# Patient Record
Sex: FEMALE | Race: BLACK OR AFRICAN AMERICAN | Employment: UNEMPLOYED | URBAN - METROPOLITAN AREA
[De-identification: names, ages, dates, MRNs, and addresses within clinical notes are randomized per-mention and may not be internally consistent; named-entity substitution may affect disease eponyms.]

---

## 2017-03-23 ENCOUNTER — OFFICE VISIT (OUTPATIENT)
Dept: INTERNAL MEDICINE CLINIC | Age: 32
End: 2017-03-23

## 2017-03-23 VITALS
HEART RATE: 67 BPM | WEIGHT: 160 LBS | TEMPERATURE: 97.2 F | OXYGEN SATURATION: 100 % | HEIGHT: 62 IN | BODY MASS INDEX: 29.44 KG/M2 | RESPIRATION RATE: 16 BRPM | DIASTOLIC BLOOD PRESSURE: 57 MMHG | SYSTOLIC BLOOD PRESSURE: 105 MMHG

## 2017-03-23 DIAGNOSIS — F41.8 SITUATIONAL ANXIETY: Primary | ICD-10-CM

## 2017-03-23 RX ORDER — ALPRAZOLAM 0.5 MG/1
0.5 TABLET ORAL
Qty: 15 TAB | Refills: 0 | Status: SHIPPED | OUTPATIENT
Start: 2017-03-23 | End: 2018-02-23 | Stop reason: ALTCHOICE

## 2017-03-23 NOTE — MR AVS SNAPSHOT
Visit Information Date & Time Provider Department Dept. Phone Encounter #  
 3/23/2017 10:15 AM Darin Box MD [facility] Internal Medicine [phone] [id number] Follow-up Instructions Return in about 2 weeks (around 4/6/2017) for complete physical  and fasting blood work. Dwayne Mary Upcoming Health Maintenance Date Due Pneumococcal 19-64 Medium Risk (1 of 1 - PPSV23) 4/24/2002 DTaP/Tdap/Td series (1 - Tdap) 4/24/2004 PAP AKA CERVICAL CYTOLOGY 4/24/2004 Allergies as of 3/23/2017  Review Complete On: 3/23/2017 By: Bernadette Rowe MD  
  
 Severity Noted Reaction Type Reactions Ibuprofen  08/16/2012    Hives Motrin [Ibuprofen]  12/05/2012    Hives, Itching Current Immunizations  Never Reviewed No immunizations on file. Not reviewed this visit You Were Diagnosed With   
  
 Codes Comments Situational anxiety    -  Primary ICD-10-CM: F41.8 ICD-9-CM: 300.09 Vitals BP Pulse Temp Resp Height(growth percentile) Weight(growth percentile) 105/57 (BP 1 Location: Left arm, BP Patient Position: Sitting) 67 97.2 °F (36.2 °C) (Oral) 16 5' 2\" (1.575 m) 160 lb (72.6 kg) LMP SpO2 BMI OB Status Smoking Status 03/03/2017 100% 29.26 kg/m2 Having regular periods Current Every Day Smoker Vitals History BMI and BSA Data Body Mass Index Body Surface Area  
 29.26 kg/m 2 1.78 m 2 Preferred Pharmacy Pharmacy Name Phone [id number], [address] [place] [address] [phone] Your Updated Medication List  
  
   
This list is accurate as of: 3/23/17 10:46 AM.  Always use your most recent med list.  
  
  
  
  
 ALPRAZolam 0.5 mg tablet Commonly known as:  Rosalba Adams Take 1 Tab by mouth nightly as needed. Max Daily Amount: 0.5 mg. Indications: ANXIETY HYDROcodone-acetaminophen 7.5-325 mg per tablet Commonly known as:  Tico Guevara  
 Take 1 Tab by mouth every six (6) hours as needed for Pain. Max Daily Amount: 4 Tabs. oxyCODONE-acetaminophen 5-325 mg per tablet Commonly known as:  PERCOCET Take 1 Tab by mouth every four (4) hours as needed for Pain (Patient must fill amoxicillin in order to fill this. ). Prescriptions Printed Refills ALPRAZolam (XANAX) 0.5 mg tablet 0 Sig: Take 1 Tab by mouth nightly as needed. Max Daily Amount: 0.5 mg. Indications: ANXIETY Class: Print Route: Oral  
  
Follow-up Instructions Return in about 2 weeks (around 4/6/2017) for complete physical  and fasting blood work. Adal Cox Introducing [facility] & HEALTH SERVICES! Arvin Simpson introduces WiMi5 patient portal. Now you can access parts of your medical record, email your doctor's office, and request medication refills online. 1. In your internet browser, go to [email or web address] 2. Click on the First Time User? Click Here link in the Sign In box. You will see the New Member Sign Up page. 3. Enter your WiMi5 Access Code exactly as it appears below. You will not need to use this code after youve completed the sign-up process. If you do not sign up before the expiration date, you must request a new code. · WiMi5 Access Code: [email or web address] Expires: 6/21/2017 10:46 AM 
 
4. Enter the last four digits of your Social Security Number (xxxx) and Date of Birth (mm/dd/yyyy) as indicated and click Submit. You will be taken to the next sign-up page. 5. Create a Stick and Playt ID. This will be your WiMi5 login ID and cannot be changed, so think of one that is secure and easy to remember. 6. Create a WiMi5 password. You can change your password at any time. 7. Enter your Password Reset Question and Answer. This can be used at a later time if you forget your password. 8. Enter your e-mail address. You will receive e-mail notification when new information is available in [address] 9. Click Sign Up. You can now view and download portions of your medical record. 10. Click the Download Summary menu link to download a portable copy of your medical information. If you have questions, please visit the Frequently Asked Questions section of the TVPage website. Remember, TVPage is NOT to be used for urgent needs. For medical emergencies, dial 911. Now available from your iPhone and Android! Please provide this summary of care documentation to your next provider. Your primary care clinician is listed as [address] [place] [address]. If you have any questions after today's visit, please call [phone].

## 2017-03-23 NOTE — PROGRESS NOTES
Subjective:     Chief Complaint   Patient presents with    Medication Refill        She  is a 33y.o. year old female who presents as a new patient to establish as well as discuss about anxiety. She reports that she has h/o anxiety always triggers by family issues but since her father had been sick with lung cancer anxiety is worse. She was on Xanax off and on in the past.   She sates that her father just passed away this morning in [place]. She is having severe anxiety attack, cannot sleep at night. Otherwise no other medical condition. A comprehensive review of systems was negative except for that written in the HPI. Objective:     Vitals:    03/23/17 1019   BP: 105/57   Pulse: 67   Resp: 16   Temp: 97.2 °F (36.2 °C)   TempSrc: Oral   SpO2: 100%   Weight: 160 lb (72.6 kg)   Height: 5' 2\" (1.575 m)       Physical Examination: General appearance - alert, well appearing, and in no distress, oriented to person, place, and time and overweight  Mental status - alert, oriented to person, place, and time, normal  behavior, speech, dress, motor activity, and thought processes. She was tearful but consolable.   Chest - clear to auscultation, no wheezes, rales or rhonchi, symmetric air entry  Heart - normal rate, regular rhythm, normal S1, S2, no murmurs, rubs, clicks or gallops  Neurological - alert, oriented, normal speech, no focal findings or movement disorder noted    Allergies   Allergen Reactions    Ibuprofen Hives    Motrin [Ibuprofen] Hives and Itching      Social History     Social History    Marital status: SINGLE     Spouse name: N/A    Number of children: N/A    Years of education: N/A     Social History Main Topics    Smoking status: Current Every Day Smoker     Packs/day: 0.50    Smokeless tobacco: None    Alcohol use No    Drug use: Yes     Special: Marijuana      Comment: rarely    Sexual activity: Yes     Partners: Male     Birth control/ protection: None, Surgical     Other Topics Concern    None     Social History Narrative    ** Merged History Encounter **           Family History   Problem Relation Age of Onset    No Known Problems Mother     Cancer Father      lung      Past Surgical History:   Procedure Laterality Date    HX GYN          HX GYN      tubal ligation      Past Medical History:   Diagnosis Date    Other ill-defined conditions(589.89)     STD's      Current Outpatient Prescriptions   Medication Sig Dispense Refill    ALPRAZolam (XANAX) 0.5 mg tablet Take 1 Tab by mouth nightly as needed. Max Daily Amount: 0.5 mg. Indications: ANXIETY 15 Tab 0    HYDROcodone-acetaminophen (NORCO) 7.5-325 mg per tablet Take 1 Tab by mouth every six (6) hours as needed for Pain. Max Daily Amount: 4 Tabs. 12 Tab 0    oxyCODONE-acetaminophen (PERCOCET) 5-325 mg per tablet Take 1 Tab by mouth every four (4) hours as needed for Pain (Patient must fill amoxicillin in order to fill this.). 20 Tab 0        Assessment/ Plan:   [place] [facility] was seen today for medication refill. Diagnoses and all orders for this visit:    Situational anxiety  -     ALPRAZolam (XANAX) 0.5 mg tablet; Take 1 Tab by mouth nightly as needed. Max Daily Amount: 0.5 mg. Indications: ANXIETY   checked. Last refill of Xanax was in 10/2016. Medication risks/benefits/costs/interactions/alternatives discussed with patient. Advised patient to call back or return to office if symptoms worsen/change/persist. If patient cannot reach us or should anything more severe/urgent arise he/she should proceed directly to the nearest emergency department. Discussed expected course/resolution/complications of diagnosis in detail with patient. Patient given a written after visit summary which includes her diagnoses, current medications and vitals. Patient expressed understanding with the diagnosis and plan.        Follow-up Disposition:  Return in about 2 weeks (around 2017) for complete physical  and fasting blood work. Jose Koch

## 2017-04-06 ENCOUNTER — OFFICE VISIT (OUTPATIENT)
Dept: INTERNAL MEDICINE CLINIC | Age: 32
End: 2017-04-06

## 2017-04-06 VITALS
TEMPERATURE: 96.6 F | OXYGEN SATURATION: 100 % | DIASTOLIC BLOOD PRESSURE: 64 MMHG | HEART RATE: 69 BPM | BODY MASS INDEX: 28.89 KG/M2 | SYSTOLIC BLOOD PRESSURE: 95 MMHG | WEIGHT: 157 LBS | HEIGHT: 62 IN | RESPIRATION RATE: 16 BRPM

## 2017-04-06 DIAGNOSIS — F41.9 ANXIETY: ICD-10-CM

## 2017-04-06 DIAGNOSIS — Z00.00 WELL ADULT ON ROUTINE HEALTH CHECK: Primary | ICD-10-CM

## 2017-04-06 RX ORDER — BUSPIRONE HYDROCHLORIDE 7.5 MG/1
7.5 TABLET ORAL 3 TIMES DAILY
Qty: 90 TAB | Refills: 0 | Status: SHIPPED | OUTPATIENT
Start: 2017-04-06 | End: 2018-02-23 | Stop reason: SDUPTHER

## 2017-04-06 RX ORDER — ALPRAZOLAM 0.5 MG/1
0.5 TABLET ORAL
Qty: 15 TAB | Refills: 0 | Status: CANCELLED | OUTPATIENT
Start: 2017-04-06

## 2017-04-06 NOTE — PROGRESS NOTES
1. Have you been to the ER, urgent care clinic since your last visit? Hospitalized since your last visit? No    2. Have you seen or consulted any other health care providers outside of the [address] [place] [address] since your last visit? Include any pap smears or colon screening.  No

## 2017-04-06 NOTE — PROGRESS NOTES
Subjective:   33 y.o. female for Well Woman Check. Patient's last menstrual period was 2017. Patient reports that she is in the process of finding a psychiatrist. Nimisha Denney is getting worse. In the process of finding a job. Social History: single partner, contraception - surgical..  Pertinent past medical hstory: no history of HTN, DVT, CAD, DM, liver disease, migraines. There is no problem list on file for this patient. Current Outpatient Prescriptions   Medication Sig Dispense Refill    busPIRone (BUSPAR) 7.5 mg tablet Take 1 Tab by mouth three (3) times daily. 90 Tab 0    ALPRAZolam (XANAX) 0.5 mg tablet Take 1 Tab by mouth nightly as needed. Max Daily Amount: 0.5 mg. Indications: ANXIETY 15 Tab 0    HYDROcodone-acetaminophen (NORCO) 7.5-325 mg per tablet Take 1 Tab by mouth every six (6) hours as needed for Pain. Max Daily Amount: 4 Tabs. 12 Tab 0    oxyCODONE-acetaminophen (PERCOCET) 5-325 mg per tablet Take 1 Tab by mouth every four (4) hours as needed for Pain (Patient must fill amoxicillin in order to fill this.). 20 Tab 0     Allergies   Allergen Reactions    Ibuprofen Hives    Motrin [Ibuprofen] Hives and Itching     Past Medical History:   Diagnosis Date    Other ill-defined conditions     STD's     Past Surgical History:   Procedure Laterality Date    HX GYN          HX GYN      tubal ligation     Family History   Problem Relation Age of Onset    No Known Problems Mother     Cancer Father      lung     Social History   Substance Use Topics    Smoking status: Current Every Day Smoker     Packs/day: 0.50    Smokeless tobacco: Not on file    Alcohol use No        ROS:  Feeling well. No dyspnea or chest pain on exertion. No abdominal pain, change in bowel habits, black or bloody stools. No urinary tract symptoms. GYN ROS: normal menses, no abnormal bleeding, pelvic pain or discharge, no discharge or pelvic pain, cyclic withdrawal menses only.  No neurological complaints. Objective:     Visit Vitals    BP 95/64 (BP 1 Location: Left arm, BP Patient Position: Sitting)    Pulse 69    Temp 96.6 °F (35.9 °C) (Oral)    Resp 16    Ht 5' 2\" (1.575 m)    Wt 157 lb (71.2 kg)    LMP 04/03/2017    SpO2 100%    BMI 28.72 kg/m2     The patient appears well, alert, oriented x 3, in no distress. ENT normal.  Neck supple. No adenopathy or thyromegaly. DIANE. Lungs are clear, good air entry, no wheezes, rhonchi or rales. S1 and S2 normal, no murmurs, regular rate and rhythm. Abdomen soft without tenderness, guarding, mass or organomegaly. Extremities show no edema, normal peripheral pulses. Neurological is normal, no focal findings. BREAST EXAM: breasts appear normal, no suspicious masses, no skin or nipple changes or axillary nodes, chaperoned by Nicanor. PELVIC EXAM: she is on her period. Assessment/Plan:   well woman  pap smear  counseled on breast self exam and STD prevention  additional lab tests per orders  return annually or prn    ICD-10-CM ICD-9-CM    1. Well adult on routine health check Z00.00 V70.0 CBC WITH AUTOMATED DIFF      UA/M W/RFLX CULTURE, COMP      TSH AND FREE T4      HEMOGLOBIN A1C WITH EAG      METABOLIC PANEL, COMPREHENSIVE      LIPID PANEL   2. Anxiety F41.9 300.00 busPIRone (BUSPAR) 7.5 mg tablet     [facility] was seen today for complete physical and medication refill. Diagnoses and all orders for this visit:    Well adult on routine health check  -     CBC WITH AUTOMATED DIFF  -     UA/M W/RFLX CULTURE, COMP  -     TSH AND FREE T4  -     HEMOGLOBIN A1C WITH EAG  -     METABOLIC PANEL, COMPREHENSIVE  -     LIPID PANEL    Anxiety  -   start  busPIRone (BUSPAR) 7.5 mg tablet; Take 1 Tab by mouth three (3) times daily. Follow-up Disposition:  Return in about 1 month (around 5/6/2017) for anxiety. make an appointment for pap as well in 1-2 weeks.  .  reviewed diet, exercise and weight control  very strongly urged to quit smoking to reduce cardiovascular risk  reviewed medications and side effects in detail.

## 2017-04-06 NOTE — MR AVS SNAPSHOT
Visit Information Date & Time Provider Department Dept. Phone Encounter #  
 4/6/2017 11:15 AM Darin Rothman MD [facility] Internal Medicine [phone] [id number] Follow-up Instructions Return in about 1 month (around 5/6/2017) for anxiety. make an appointment for pap as well in 1-2 weeks. .  
  
Your Appointments 4/6/2017 11:15 AM  
COMPLETE PHYSICAL with Tc Bonilla MD  
[facility] Internal Medicine [address]) Appt Note: physical  
 [address] [address]  
[phone]  
  
   
 [place] [address] Upcoming Health Maintenance Date Due Pneumococcal 19-64 Medium Risk (1 of 1 - PPSV23) 4/24/2002 DTaP/Tdap/Td series (1 - Tdap) 4/24/2004 PAP AKA CERVICAL CYTOLOGY 6/9/2019 Allergies as of 4/6/2017  Review Complete On: 4/6/2017 By: [place] Severity Noted Reaction Type Reactions Ibuprofen  08/16/2012    Hives Motrin [Ibuprofen]  12/05/2012    Hives, Itching Current Immunizations  Never Reviewed No immunizations on file. Not reviewed this visit You Were Diagnosed With   
  
 Codes Comments Well adult on routine health check    -  Primary ICD-10-CM: Z00.00 ICD-9-CM: V70.0 Anxiety     ICD-10-CM: F41.9 ICD-9-CM: 300.00 Vitals BP Pulse Temp Resp Height(growth percentile) Weight(growth percentile) 95/64 (BP 1 Location: Left arm, BP Patient Position: Sitting) 69 96.6 °F (35.9 °C) (Oral) 16 5' 2\" (1.575 m) 157 lb (71.2 kg) LMP SpO2 BMI OB Status Smoking Status 04/03/2017 100% 28.72 kg/m2 Having regular periods Current Every Day Smoker Vitals History BMI and BSA Data Body Mass Index Body Surface Area 28.72 kg/m 2 1.76 m 2 Preferred Pharmacy Pharmacy Name Phone [id number] [address], [address] [phone] Your Updated Medication List  
  
   
 This list is accurate as of: 4/6/17  9:24 AM.  Always use your most recent med list.  
  
  
  
  
 ALPRAZolam 0.5 mg tablet Commonly known as:  Nickolas Campa Take 1 Tab by mouth nightly as needed. Max Daily Amount: 0.5 mg. Indications: ANXIETY  
  
 busPIRone 7.5 mg tablet Commonly known as:  BUSPAR Take 1 Tab by mouth three (3) times daily. HYDROcodone-acetaminophen 7.5-325 mg per tablet Commonly known as:  Kriste Avery Take 1 Tab by mouth every six (6) hours as needed for Pain. Max Daily Amount: 4 Tabs. oxyCODONE-acetaminophen 5-325 mg per tablet Commonly known as:  PERCOCET Take 1 Tab by mouth every four (4) hours as needed for Pain (Patient must fill amoxicillin in order to fill this. ). Prescriptions Sent to Pharmacy Refills  
 busPIRone (BUSPAR) 7.5 mg tablet 0 Sig: Take 1 Tab by mouth three (3) times daily. Class: Normal  
 Pharmacy: [facility] [place] [address] Ph #: [phone] Route: Oral  
  
We Performed the Following CBC WITH AUTOMATED DIFF [41146 CPT(R)] HEMOGLOBIN A1C WITH EAG [80500 CPT(R)] LIPID PANEL [31408 CPT(R)] METABOLIC PANEL, COMPREHENSIVE [79739 CPT(R)] TSH AND FREE T4 [17672 CPT(R)] UA/M W/RFLX CULTURE, COMP [74976 CPT(R)] Follow-up Instructions Return in about 1 month (around 5/6/2017) for anxiety. make an appointment for pap as well in 1-2 weeks. .  
  
  
Introducing [facility] & HEALTH SERVICES! [place] Part introduces TouchOfModern patient portal. Now you can access parts of your medical record, email your doctor's office, and request medication refills online. 1. In your internet browser, go to [email or web address] 2. Click on the First Time User? Click Here link in the Sign In box. You will see the New Member Sign Up page. 3. Enter your TouchOfModern Access Code exactly as it appears below.  You will not need to use this code after youve completed the sign-up process. If you do not sign up before the expiration date, you must request a new code. · UTILICASE Access Code: [email or web address] Expires: 6/21/2017 10:46 AM 
 
4. Enter the last four digits of your Social Security Number (xxxx) and Date of Birth (mm/dd/yyyy) as indicated and click Submit. You will be taken to the next sign-up page. 5. Create a UTILICASE ID. This will be your UTILICASE login ID and cannot be changed, so think of one that is secure and easy to remember. 6. Create a UTILICASE password. You can change your password at any time. 7. Enter your Password Reset Question and Answer. This can be used at a later time if you forget your password. 8. Enter your e-mail address. You will receive e-mail notification when new information is available in [address] 9. Click Sign Up. You can now view and download portions of your medical record. 10. Click the Download Summary menu link to download a portable copy of your medical information. If you have questions, please visit the Frequently Asked Questions section of the UTILICASE website. Remember, UTILICASE is NOT to be used for urgent needs. For medical emergencies, dial 911. Now available from your iPhone and Android! Please provide this summary of care documentation to your next provider. Your primary care clinician is listed as [address] [place] [address]. If you have any questions after today's visit, please call [phone].

## 2017-04-08 LAB
ALBUMIN SERPL-MCNC: 4.2 G/DL (ref 3.5–5.5)
ALBUMIN/GLOB SERPL: 1.9 {RATIO} (ref 1.2–2.2)
ALP SERPL-CCNC: 50 IU/L (ref 39–117)
ALT SERPL-CCNC: 8 IU/L (ref 0–32)
APPEARANCE UR: ABNORMAL
AST SERPL-CCNC: 16 IU/L (ref 0–40)
BACTERIA #/AREA URNS HPF: ABNORMAL /[HPF]
BACTERIA UR CULT: ABNORMAL
BASOPHILS # BLD AUTO: 0 X10E3/UL (ref 0–0.2)
BASOPHILS NFR BLD AUTO: 1 %
BILIRUB SERPL-MCNC: 0.3 MG/DL (ref 0–1.2)
BILIRUB UR QL STRIP: NEGATIVE
BUN SERPL-MCNC: 10 MG/DL (ref 6–20)
BUN/CREAT SERPL: 12 (ref 9–23)
CALCIUM SERPL-MCNC: 9.2 MG/DL (ref 8.7–10.2)
CASTS URNS QL MICRO: ABNORMAL /LPF
CHLORIDE SERPL-SCNC: 99 MMOL/L (ref 96–106)
CHOLEST SERPL-MCNC: 216 MG/DL (ref 100–199)
CO2 SERPL-SCNC: 26 MMOL/L (ref 18–29)
COLOR UR: YELLOW
CREAT SERPL-MCNC: 0.83 MG/DL (ref 0.57–1)
EOSINOPHIL # BLD AUTO: 0.2 X10E3/UL (ref 0–0.4)
EOSINOPHIL NFR BLD AUTO: 3 %
EPI CELLS #/AREA URNS HPF: ABNORMAL /HPF
ERYTHROCYTE [DISTWIDTH] IN BLOOD BY AUTOMATED COUNT: 13.8 % (ref 12.3–15.4)
EST. AVERAGE GLUCOSE BLD GHB EST-MCNC: 120 MG/DL
GLOBULIN SER CALC-MCNC: 2.2 G/DL (ref 1.5–4.5)
GLUCOSE SERPL-MCNC: 84 MG/DL (ref 65–99)
GLUCOSE UR QL: NEGATIVE
HBA1C MFR BLD: 5.8 % (ref 4.8–5.6)
HCT VFR BLD AUTO: 37.3 % (ref 34–46.6)
HDLC SERPL-MCNC: 59 MG/DL
HGB BLD-MCNC: 11.9 G/DL (ref 11.1–15.9)
HGB UR QL STRIP: ABNORMAL
IMM GRANULOCYTES # BLD: 0 X10E3/UL (ref 0–0.1)
IMM GRANULOCYTES NFR BLD: 0 %
INTERPRETATION, 910389: NORMAL
KETONES UR QL STRIP: NEGATIVE
LDLC SERPL CALC-MCNC: 142 MG/DL (ref 0–99)
LEUKOCYTE ESTERASE UR QL STRIP: NEGATIVE
LYMPHOCYTES # BLD AUTO: 2.9 X10E3/UL (ref 0.7–3.1)
LYMPHOCYTES NFR BLD AUTO: 48 %
MCH RBC QN AUTO: 27.3 PG (ref 26.6–33)
MCHC RBC AUTO-ENTMCNC: 31.9 G/DL (ref 31.5–35.7)
MCV RBC AUTO: 86 FL (ref 79–97)
MICRO URNS: ABNORMAL
MONOCYTES # BLD AUTO: 0.4 X10E3/UL (ref 0.1–0.9)
MONOCYTES NFR BLD AUTO: 6 %
MUCOUS THREADS URNS QL MICRO: PRESENT
NEUTROPHILS # BLD AUTO: 2.6 X10E3/UL (ref 1.4–7)
NEUTROPHILS NFR BLD AUTO: 42 %
NITRITE UR QL STRIP: POSITIVE
PH UR STRIP: 6 [PH] (ref 5–7.5)
PLATELET # BLD AUTO: 376 X10E3/UL (ref 150–379)
POTASSIUM SERPL-SCNC: 4 MMOL/L (ref 3.5–5.2)
PROT SERPL-MCNC: 6.4 G/DL (ref 6–8.5)
PROT UR QL STRIP: ABNORMAL
RBC # BLD AUTO: 4.36 X10E6/UL (ref 3.77–5.28)
RBC #/AREA URNS HPF: ABNORMAL /HPF
SODIUM SERPL-SCNC: 140 MMOL/L (ref 134–144)
SP GR UR: 1.02 (ref 1–1.03)
T4 FREE SERPL-MCNC: 1.21 NG/DL (ref 0.82–1.77)
TRIGL SERPL-MCNC: 73 MG/DL (ref 0–149)
TSH SERPL DL<=0.005 MIU/L-ACNC: 0.93 UIU/ML (ref 0.45–4.5)
URINALYSIS REFLEX , 377201: ABNORMAL
UROBILINOGEN UR STRIP-MCNC: 0.2 MG/DL (ref 0.2–1)
VLDLC SERPL CALC-MCNC: 15 MG/DL (ref 5–40)
WBC # BLD AUTO: 6.2 X10E3/UL (ref 3.4–10.8)
WBC #/AREA URNS HPF: ABNORMAL /HPF

## 2017-04-10 ENCOUNTER — TELEPHONE (OUTPATIENT)
Dept: INTERNAL MEDICINE CLINIC | Age: 32
End: 2017-04-10

## 2017-04-10 DIAGNOSIS — N30.00 ACUTE CYSTITIS WITHOUT HEMATURIA: Primary | ICD-10-CM

## 2017-04-10 RX ORDER — SULFAMETHOXAZOLE AND TRIMETHOPRIM 800; 160 MG/1; MG/1
1 TABLET ORAL 2 TIMES DAILY
Qty: 10 TAB | Refills: 0 | Status: SHIPPED | OUTPATIENT
Start: 2017-04-10 | End: 2017-04-15

## 2017-04-10 NOTE — PROGRESS NOTES
Please call patient:     1. Urine is positive for UTI. Sent a prescription of bactrim to pharmacy. 2. CBC, kidney, liver, thyroid level is within normal range. 3. Cholesterol level is above normal range. Considering  risk factors no need to start any medication at this point. Continue watching your diet, eat healthy, less allen and fatty food, more baked or grilled or broiled food. Exercise 150 minutes/week will be helpful as well. Will recheck level in 6 months. 4. Prediabetes: Average blood sugar( Hg A1c ) is 5.8, in the range of prediabetic level. Prediabetes is a warning sign that you are at risk for getting type 2 diabetes. It means that your blood sugar is higher than it should be. Most people who get type 2 diabetes have prediabetes first. The good news is that lifestyle changes may help you get your blood sugar back to normal and avoid or delay diabetes. Will recheck this level in 6 months.

## 2017-04-10 NOTE — TELEPHONE ENCOUNTER
----- Message from Liliya Conn MD sent at 4/10/2017 12:56 PM EDT -----  Please call patient:     1. Urine is positive for UTI. Sent a prescription of bactrim to pharmacy. 2. CBC, kidney, liver, thyroid level is within normal range. 3. Cholesterol level is above normal range. Considering  risk factors no need to start any medication at this point. Continue watching your diet, eat healthy, less allen and fatty food, more baked or grilled or broiled food. Exercise 150 minutes/week will be helpful as well. Will recheck level in 6 months. 4. Prediabetes: Average blood sugar( Hg A1c ) is 5.8, in the range of prediabetic level. Prediabetes is a warning sign that you are at risk for getting type 2 diabetes. It means that your blood sugar is higher than it should be. Most people who get type 2 diabetes have prediabetes first. The good news is that lifestyle changes may help you get your blood sugar back to normal and avoid or delay diabetes. Will recheck this level in 6 months.

## 2017-05-30 ENCOUNTER — HOSPITAL ENCOUNTER (EMERGENCY)
Age: 32
Discharge: HOME | End: 2017-05-30
Attending: EMERGENCY MEDICINE
Payer: MEDICAID

## 2017-05-30 VITALS
HEIGHT: 62 IN | RESPIRATION RATE: 18 BRPM | BODY MASS INDEX: 27.05 KG/M2 | TEMPERATURE: 98.2 F | DIASTOLIC BLOOD PRESSURE: 64 MMHG | WEIGHT: 147 LBS | OXYGEN SATURATION: 100 % | HEART RATE: 72 BPM | SYSTOLIC BLOOD PRESSURE: 137 MMHG

## 2017-05-30 DIAGNOSIS — K08.89 DENTALGIA: Primary | ICD-10-CM

## 2017-05-30 PROCEDURE — 74011250637 HC RX REV CODE- 250/637: Performed by: PHYSICIAN ASSISTANT

## 2017-05-30 PROCEDURE — 99283 EMERGENCY DEPT VISIT LOW MDM: CPT

## 2017-05-30 PROCEDURE — 74011000250 HC RX REV CODE- 250: Performed by: PHYSICIAN ASSISTANT

## 2017-05-30 RX ORDER — PENICILLIN V POTASSIUM 500 MG/1
500 TABLET, FILM COATED ORAL EVERY 6 HOURS
Qty: 40 TAB | Refills: 0 | Status: SHIPPED | OUTPATIENT
Start: 2017-05-30 | End: 2017-06-09

## 2017-05-30 RX ORDER — TRAMADOL HYDROCHLORIDE AND ACETAMINOPHEN 37.5; 325 MG/1; MG/1
1 TABLET ORAL
Qty: 12 TAB | Refills: 0 | Status: SHIPPED | OUTPATIENT
Start: 2017-05-30 | End: 2018-02-23 | Stop reason: ALTCHOICE

## 2017-05-30 RX ADMIN — LIDOCAINE HYDROCHLORIDE: 20 SOLUTION ORAL; TOPICAL at 13:31

## 2017-05-30 NOTE — ED NOTES
Discharge instructions and 2 prescriptions reviewed with patient. Patient verbalizes understanding and denies any questions. Patient alert and oriented and ambulatory out of ED in no apparent distress. Patient declined wheelchair.

## 2017-05-30 NOTE — DISCHARGE INSTRUCTIONS
Dental Pain: After Your Visit  Your Care Instructions  The most common cause of dental pain is tooth decay. It can also be caused by an infection of the tooth (abscess) or gum, a tooth that has not broken all the way through the gum (impacted tooth), or a problem with the nerve-filled center of the tooth. Follow-up care is a key part of your treatment and safety. Be sure to make and go to all appointments, and call your doctor if you are having problems. Its also a good idea to know your test results and keep a list of the medicines you take. How can you care for yourself at home? · Contact a dentist for follow-up care. · Put ice or a cold pack on the outside of your mouth for 10 to 20 minutes at a time to reduce pain and swelling. Put a thin cloth between the ice and your skin. · Take an over-the-counter pain medicine, such as acetaminophen (Tylenol), ibuprofen (Advil, Motrin), or naproxen (Aleve). Read and follow all instructions on the label. · Do not take two or more pain medicines at the same time unless the doctor told you to. Many pain medicines have acetaminophen, which is Tylenol. Too much acetaminophen (Tylenol) can be harmful. · Rinse your mouth with warm salt water every 2 hours to help relieve pain and swelling from an infected tooth. Mix 1 teaspoon of salt in 8 ounces of water. · If your doctor prescribed antibiotics, take them as directed. Do not stop taking them just because you feel better. You need to take the full course of antibiotics. When should you call for help? Call your doctor now or seek immediate medical care if:  · You have signs of infection, such as:  ¨ Increased pain, swelling, warmth, or redness. ¨ Pus draining from the gum, tooth, or face. ¨ A fever. Watch closely for changes in your health, and be sure to contact your doctor if:  · You do not get better as expected. Where can you learn more?    Go to Planandoo.be  Enter Z364 in the search box to learn more about \"Dental Pain: After Your Visit. \"   © 2341-0343 Healthwise, Incorporated. Care instructions adapted under license by Eulalia Edward (which disclaims liability or warranty for this information). This care instruction is for use with your licensed healthcare professional. If you have questions about a medical condition or this instruction, always ask your healthcare professional. [place] [address] any warranty or liability for your use of this information. Content Version: 51.7.981367;  Last Revised: May 17, 2013

## 2017-05-30 NOTE — ED NOTES
Patient alert and oriented x4. Patient reports left lower side dental pain x1 days. Reports taking tylenol without relief. Patient denies any other complaints at this time. Emergency Department Nursing Plan of Care       The Nursing Plan of Care is developed from the Nursing assessment and Emergency Department Attending provider initial evaluation. The plan of care may be reviewed in the ED Provider note.     The Plan of Care was developed with the following considerations:   Patient / Family readiness to learn indicated by:verbalized understanding  Persons(s) to be included in education: patient  Barriers to Learning/Limitations:No    Signed     Valentino Dark, RN    5/30/2017   1:20 PM

## 2017-05-30 NOTE — ED PROVIDER NOTES
Patient is a 27 y.o. female presenting with dental problem. The history is provided by the patient. Dental Pain    This is a new problem. The current episode started yesterday. The problem occurs constantly. The problem has not changed since onset. The pain is located in the left lower mouth. The quality of the pain is aching. The pain is at a severity of 8/10. The pain is moderate. Associated symptoms include swelling. There was no vomiting, no nausea and no fever. She has tried nothing for the symptoms. Past Medical History:   Diagnosis Date    Other ill-defined conditions     STD's       Past Surgical History:   Procedure Laterality Date    HX GYN          HX GYN      tubal ligation         Family History:   Problem Relation Age of Onset    No Known Problems Mother     Cancer Father      lung       Social History     Social History    Marital status: SINGLE     Spouse name: N/A    Number of children: N/A    Years of education: N/A     Occupational History    Not on file. Social History Main Topics    Smoking status: Current Every Day Smoker     Packs/day: 0.50    Smokeless tobacco: Not on file    Alcohol use No    Drug use: Yes     Special: Marijuana      Comment: rarely    Sexual activity: Yes     Partners: Male     Birth control/ protection: None, Surgical     Other Topics Concern    Not on file     Social History Narrative    ** Merged History Encounter **              ALLERGIES: Ibuprofen and Motrin [ibuprofen]    Review of Systems   Constitutional: Negative for fever. HENT: Positive for dental problem and facial swelling. Negative for drooling. Respiratory: Negative for cough and shortness of breath. Neurological: Negative for speech difficulty and weakness. All other systems reviewed and are negative.       Vitals:    17 1308   BP: 137/64   Pulse: 72   Resp: 18   Temp: 98.2 °F (36.8 °C)   SpO2: 100%   Weight: 66.7 kg (147 lb)   Height: 5' 2\" (1.575 m) Physical Exam   Constitutional: She is oriented to person, place, and time. She appears well-developed and well-nourished. No distress. HENT:   Head: Normocephalic and atraumatic. Mouth/Throat: Uvula is midline, oropharynx is clear and moist and mucous membranes are normal. Abnormal dentition. Dental caries present. No dental abscesses. Cardiovascular: Normal rate, regular rhythm and normal heart sounds. Pulmonary/Chest: Effort normal and breath sounds normal. No respiratory distress. She has no wheezes. She has no rales. Musculoskeletal: Normal range of motion. Neurological: She is alert and oriented to person, place, and time. Skin: Skin is warm and dry. Nursing note and vitals reviewed.        MDM  Number of Diagnoses or Management Options  Diagnosis management comments: DDX: dentalgia, dental caries, dental abscess    ED Course       Procedures

## 2018-02-19 DIAGNOSIS — F41.8 SITUATIONAL ANXIETY: ICD-10-CM

## 2018-02-19 RX ORDER — ALPRAZOLAM 0.5 MG/1
0.5 TABLET ORAL
Qty: 15 TAB | Refills: 0 | OUTPATIENT
Start: 2018-02-19

## 2018-02-23 ENCOUNTER — OFFICE VISIT (OUTPATIENT)
Dept: INTERNAL MEDICINE CLINIC | Age: 33
End: 2018-02-23

## 2018-02-23 VITALS
DIASTOLIC BLOOD PRESSURE: 63 MMHG | HEART RATE: 82 BPM | SYSTOLIC BLOOD PRESSURE: 121 MMHG | BODY MASS INDEX: 33.68 KG/M2 | WEIGHT: 183 LBS | TEMPERATURE: 97.3 F | OXYGEN SATURATION: 100 % | RESPIRATION RATE: 18 BRPM | HEIGHT: 62 IN

## 2018-02-23 DIAGNOSIS — G47.9 SLEEPING DIFFICULTIES: ICD-10-CM

## 2018-02-23 DIAGNOSIS — F41.9 ANXIETY: Primary | ICD-10-CM

## 2018-02-23 RX ORDER — BUSPIRONE HYDROCHLORIDE 7.5 MG/1
7.5 TABLET ORAL 3 TIMES DAILY
Qty: 90 TAB | Refills: 2 | Status: SHIPPED | OUTPATIENT
Start: 2018-02-23

## 2018-02-23 RX ORDER — MIRTAZAPINE 15 MG/1
15 TABLET, ORALLY DISINTEGRATING ORAL
Qty: 15 TAB | Refills: 0 | Status: SHIPPED | OUTPATIENT
Start: 2018-02-23 | End: 2018-03-06 | Stop reason: SDUPTHER

## 2018-02-23 NOTE — MR AVS SNAPSHOT
[facility] [address] 
[phone] Patient: Grace Giraldo MRN: [id number] Visit Information Date & Time Provider Department Dept. Phone Encounter #  
 2/23/2018  1:45 PM Darin Cantu MD [facility] [phone] [id number] Follow-up Instructions Return in about 3 months (around 5/23/2018). Upcoming Health Maintenance Date Due Pneumococcal 19-64 Medium Risk (1 of 1 - PPSV23) 4/24/2002 DTaP/Tdap/Td series (1 - Tdap) 4/24/2004 Influenza Age 5 to Adult 8/1/2017 PAP AKA CERVICAL CYTOLOGY 6/9/2019 Allergies as of 2/23/2018  Review Complete On: 2/23/2018 By: Otto Ballesteros MD  
  
 Severity Noted Reaction Type Reactions Ibuprofen  08/16/2012    Hives Motrin [Ibuprofen]  12/05/2012    Hives, Itching Current Immunizations  Never Reviewed No immunizations on file. Not reviewed this visit You Were Diagnosed With   
  
 Codes Comments Anxiety    -  Primary ICD-10-CM: F41.9 ICD-9-CM: 300.00 Sleeping difficulties     ICD-10-CM: G47.9 ICD-9-CM: 780.50 Vitals BP Pulse Temp Resp Height(growth percentile) Weight(growth percentile) 121/63 (BP 1 Location: Left arm, BP Patient Position: Sitting) 82 97.3 °F (36.3 °C) (Temporal) 18 5' 2\" (1.575 m) 183 lb (83 kg) LMP SpO2 BMI OB Status Smoking Status 02/14/2018 100% 33.47 kg/m2 Having regular periods Current Every Day Smoker Vitals History BMI and BSA Data Body Mass Index Body Surface Area  
 33.47 kg/m 2 1.91 m 2 Preferred Pharmacy Pharmacy Name Phone [id number] [phone] Your Updated Medication List  
  
   
This list is accurate as of 2/23/18  2:10 PM.  Always use your most recent med list.  
  
  
  
  
 busPIRone 7.5 mg tablet Commonly known as:  BUSPAR  
 Take 1 Tab by mouth three (3) times daily. mirtazapine 15 mg disintegrating tablet Commonly known as:  REMERON SOL-TAB Take 1 Tab by mouth nightly. Prescriptions Sent to Pharmacy Refills  
 busPIRone (BUSPAR) 7.5 mg tablet 2 Sig: Take 1 Tab by mouth three (3) times daily. Class: Normal  
 Pharmacy: [id number] Ph #: [phone] Route: Oral  
 mirtazapine (REMERON SOL-TAB) 15 mg disintegrating tablet 0 Sig: Take 1 Tab by mouth nightly. Class: Normal  
 Pharmacy: [facility] #[id number] Ph #: [phone] Route: Oral  
  
We Performed the Following REFERRAL TO PSYCHIATRY [REF91 Custom] Follow-up Instructions Return in about 3 months (around 5/23/2018). Referral Information Referral ID Referred By Referred To  
  
 [id number] TONO GOMES [address] Phone: [phone] Fax: [phone] Visits Status Start Date End Date 1 New Request 2/23/18 2/23/19 If your referral has a status of pending review or denied, additional information will be sent to support the outcome of this decision. Introducing [facility]! Alexey Mccrary introduces uMix.TV patient portal. Now you can access parts of your medical record, email your doctor's office, and request medication refills online. 1. In your internet browser, go to [email or web address] 2. Click on the First Time User? Click Here link in the Sign In box. You will see the New Member Sign Up page. 3. Enter your uMix.TV Access Code exactly as it appears below. You will not need to use this code after youve completed the sign-up process. If you do not sign up before the expiration date, you must request a new code. · Zhaogang Access Code: [email or web address] Expires: 5/24/2018  2:10 PM 
 
4. Enter the last four digits of your Social Security Number (xxxx) and Date of Birth (mm/dd/yyyy) as indicated and click Submit. You will be taken to the next sign-up page. 5. Create a Zhaogang ID. This will be your Zhaogang login ID and cannot be changed, so think of one that is secure and easy to remember. 6. Create a Zhaogang password. You can change your password at any time. 7. Enter your Password Reset Question and Answer. This can be used at a later time if you forget your password. 8. Enter your e-mail address. You will receive e-mail notification when new information is available in [address] 9. Click Sign Up. You can now view and download portions of your medical record. 10. Click the Download Summary menu link to download a portable copy of your medical information. If you have questions, please visit the Frequently Asked Questions section of the Zhaogang website. Remember, Zhaogang is NOT to be used for urgent needs. For medical emergencies, dial 911. Now available from your iPhone and Android! Please provide this summary of care documentation to your next provider. Your primary care clinician is listed as [address] [place] [address]. If you have any questions after today's visit, please call [phone].

## 2018-02-25 NOTE — PROGRESS NOTES
Subjective:     Chief Complaint   Patient presents with    Anxiety    Medication Refill     xanax        She  is a 27y.o. year old female with h/o anxiety who present today after a year to get refill of Xanax. A year ago she came here with a complain of worsening anxiety related to her father's death. She did inform me at that time that she was on xanax off and on for anxiety treated by her last PCP. In last visit I did give her Buspar but she did not take the med. Denies any SI. States that she has sleeping difficulty. Need some medication for that. Did not try any OTC sleep aid. Pertinent items are noted in HPI.   Objective:     Vitals:    02/23/18 1351   BP: 121/63   Pulse: 82   Resp: 18   Temp: 97.3 °F (36.3 °C)   TempSrc: Temporal   SpO2: 100%   Weight: 183 lb (83 kg)   Height: 5' 2\" (1.575 m)       Physical Examination: General appearance - alert, well appearing, and in no distress, oriented to person, place, and time and overweight  Mental status - alert, oriented to person, place, and time, normal mood, behavior, speech, dress, motor activity, and thought processes  Neck - supple, no significant adenopathy, thyroid exam: thyroid is normal in size without nodules or tenderness  Chest - clear to auscultation, no wheezes, rales or rhonchi, symmetric air entry  Heart - normal rate, regular rhythm, normal S1, S2, no murmurs, rubs, clicks or gallops    Allergies   Allergen Reactions    Ibuprofen Hives    Motrin [Ibuprofen] Hives and Itching      Social History     Social History    Marital status: SINGLE     Spouse name: N/A    Number of children: N/A    Years of education: N/A     Social History Main Topics    Smoking status: Current Every Day Smoker     Packs/day: 0.50    Smokeless tobacco: Never Used    Alcohol use No    Drug use: No      Comment: rarely    Sexual activity: Yes     Partners: Male     Birth control/ protection: None, Surgical     Other Topics Concern    None     Social History Narrative    ** Merged History Encounter **           Family History   Problem Relation Age of Onset    No Known Problems Mother     Cancer Father      lung      Past Surgical History:   Procedure Laterality Date    HX GYN          HX GYN      tubal ligation      Past Medical History:   Diagnosis Date    Other ill-defined conditions(799.89)     STD's      Current Outpatient Prescriptions   Medication Sig Dispense Refill    busPIRone (BUSPAR) 7.5 mg tablet Take 1 Tab by mouth three (3) times daily. 90 Tab 2    mirtazapine (REMERON SOL-TAB) 15 mg disintegrating tablet Take 1 Tab by mouth nightly. 15 Tab 0        Assessment/ Plan:   Diagnoses and all orders for this visit:    1. Anxiety  -    Advised to start  busPIRone (BUSPAR) 7.5 mg tablet; Take 1 Tab by mouth three (3) times daily. [facility]      2. Sleeping difficulties  -     [facility]  -     Start mirtazapine (REMERON SOL-TAB) 15 mg disintegrating tablet; Take 1 Tab by mouth nightly. Medication risks/benefits/costs/interactions/alternatives discussed with patient. Advised patient to call back or return to office if symptoms worsen/change/persist. If patient cannot reach us or should anything more severe/urgent arise he/she should proceed directly to the nearest emergency department. Discussed expected course/resolution/complications of diagnosis in detail with patient. Patient given a written after visit summary which includes her diagnoses, current medications and vitals. Patient expressed understanding with the diagnosis and plan. Follow-up Disposition:  Return in about 3 months (around 2018).

## 2018-03-06 DIAGNOSIS — F41.9 ANXIETY: ICD-10-CM

## 2018-03-06 DIAGNOSIS — G47.9 SLEEPING DIFFICULTIES: ICD-10-CM

## 2018-03-06 RX ORDER — MIRTAZAPINE 15 MG/1
TABLET, ORALLY DISINTEGRATING ORAL
Qty: 15 TAB | Refills: 0 | Status: SHIPPED | OUTPATIENT
Start: 2018-03-06

## 2018-03-29 DIAGNOSIS — F41.8 SITUATIONAL ANXIETY: ICD-10-CM

## 2018-03-29 RX ORDER — ALPRAZOLAM 0.5 MG/1
0.5 TABLET ORAL
Qty: 15 TAB | Refills: 0 | OUTPATIENT
Start: 2018-03-29

## 2018-11-28 ENCOUNTER — HOSPITAL ENCOUNTER (EMERGENCY)
Age: 33
Discharge: HOME | End: 2018-11-28
Attending: EMERGENCY MEDICINE
Payer: MEDICAID

## 2018-11-28 VITALS
RESPIRATION RATE: 16 BRPM | HEIGHT: 62 IN | DIASTOLIC BLOOD PRESSURE: 63 MMHG | OXYGEN SATURATION: 100 % | BODY MASS INDEX: 29.44 KG/M2 | WEIGHT: 160 LBS | SYSTOLIC BLOOD PRESSURE: 120 MMHG | TEMPERATURE: 98.6 F

## 2018-11-28 DIAGNOSIS — K08.89 PAIN, DENTAL: Primary | ICD-10-CM

## 2018-11-28 DIAGNOSIS — K02.9 DENTAL CARIES: ICD-10-CM

## 2018-11-28 DIAGNOSIS — K04.01 ACUTE PULPITIS: ICD-10-CM

## 2018-11-28 PROCEDURE — 74011000250 HC RX REV CODE- 250: Performed by: EMERGENCY MEDICINE

## 2018-11-28 PROCEDURE — 74011250637 HC RX REV CODE- 250/637: Performed by: EMERGENCY MEDICINE

## 2018-11-28 PROCEDURE — 99283 EMERGENCY DEPT VISIT LOW MDM: CPT

## 2018-11-28 RX ORDER — NAPROXEN 500 MG/1
500 TABLET ORAL 2 TIMES DAILY WITH MEALS
Qty: 10 TAB | Refills: 0 | Status: SHIPPED | OUTPATIENT
Start: 2018-11-28 | End: 2018-12-03

## 2018-11-28 RX ORDER — NAPROXEN 250 MG/1
500 TABLET ORAL
Status: COMPLETED | OUTPATIENT
Start: 2018-11-28 | End: 2018-11-28

## 2018-11-28 RX ADMIN — LIDOCAINE HYDROCHLORIDE: 20 SOLUTION ORAL; TOPICAL at 12:17

## 2018-11-28 RX ADMIN — NAPROXEN 500 MG: 250 TABLET ORAL at 12:17

## 2018-11-28 NOTE — ED NOTES
Patient arrives with c/o lower left tooth pain today. Patient states she is going to try to get an appointment with her dentist today.

## 2018-11-28 NOTE — ED NOTES
Discharge instructions with patient and self including prescription(s) if applicable. Patient has received and verbalizes understanding of all instructions and has no further questions at this time. Patient exits ED via ambulatory accompanied by self. Patient in no acute distress.  Patient to follow-up with a dentist.

## 2018-11-28 NOTE — DISCHARGE INSTRUCTIONS
Tooth Decay: Care Instructions  Your Care Instructions    Tooth decay is damage to a tooth caused by plaque. Plaque is a thin film of bacteria that sticks to the teeth above and below the gum line. If plaque isn't removed from the teeth, it can build up and harden into tartar. The bacteria in plaque and tartar use sugars in food to make acids. These acids can cause tooth decay and gum disease. Any part of your tooth can decay, from the roots below the gum line to the chewing surface. Decay can affect the outer layer (enamel) or inner layer (dentin) of your teeth. The deeper the decay, the worse the damage. Untreated tooth decay will get worse and may lead to tooth loss. If you have a small hole (cavity) in your tooth, your dentist can repair it by removing the decay and filling the hole. If you have deeper decay, you may need more treatment. A very badly damaged tooth may have to be removed. Follow-up care is a key part of your treatment and safety. Be sure to make and go to all appointments, and call your dentist if you are having problems. It's also a good idea to know your test results and keep a list of the medicines you take. How can you care for yourself at home? If you have pain:  · Take an over-the-counter pain medicine, such as acetaminophen (Tylenol), ibuprofen (Advil, Motrin), or naproxen (Aleve). Be safe with medicines. Read and follow all instructions on the label. ? Do not take two or more pain medicines at the same time unless the doctor told you to. Many pain medicines have acetaminophen, which is Tylenol. Too much acetaminophen (Tylenol) can be harmful. · Put ice or a cold pack on your cheek over the tooth for 10 to 15 minutes at a time. Put a thin cloth between the ice and your skin. To prevent tooth decay  · Brush teeth twice a day, and floss once a day. Brushing with fluoride toothpaste and flossing may be enough to reverse early decay.   · Use a toothbrush with soft, rounded-end bristles and a head that is small enough to reach all parts of your teeth and mouth. Replace your toothbrush every 3 or 4 months. You may also use an electric toothbrush that has rotating and oscillating (back-and-forth) action. · Ask your dentist about having fluoride treatments at the dental office. · Brush your tongue to help get rid of bacteria. · Eat healthy foods that include whole grains, vegetables, and fruits. · Have your teeth cleaned by a professional at least two times a year. · Do not smoke or use smokeless tobacco. Tobacco can make tooth decay worse. When should you call for help? Call 911 anytime you think you may need emergency care. For example, call if:    · You have trouble breathing.    Call your dentist now or seek immediate medical care if:    · You have new or worse symptoms of infection, such as:  ? Increased pain, swelling, warmth, or redness. ? Red streaks leading from the area. ? Pus draining from the area. ? A fever.    Watch closely for changes in your health, and be sure to contact your doctor if:    · You do not get better as expected. Where can you learn more? Go to http://igor-daniel.info/. Enter W774 in the search box to learn more about \"Tooth Decay: Care Instructions. \"  Current as of: March 28, 2018  Content Version: 11.8  © 6713-7151 Healthwise, Incorporated. Care instructions adapted under license by [employer] (which disclaims liability or warranty for this information). If you have questions about a medical condition or this instruction, always ask your healthcare professional. [place] [address] any warranty or liability for your use of this information.

## 2019-07-29 ENCOUNTER — HOSPITAL ENCOUNTER (EMERGENCY)
Age: 34
Discharge: HOME | End: 2019-07-29
Attending: EMERGENCY MEDICINE
Payer: MEDICAID

## 2019-07-29 VITALS
TEMPERATURE: 98.3 F | DIASTOLIC BLOOD PRESSURE: 68 MMHG | OXYGEN SATURATION: 98 % | WEIGHT: 160 LBS | HEIGHT: 62 IN | HEART RATE: 84 BPM | BODY MASS INDEX: 29.44 KG/M2 | RESPIRATION RATE: 16 BRPM | SYSTOLIC BLOOD PRESSURE: 114 MMHG

## 2019-07-29 DIAGNOSIS — F19.10 SUBSTANCE ABUSE (HCC): Primary | ICD-10-CM

## 2019-07-29 PROCEDURE — 90791 PSYCH DIAGNOSTIC EVALUATION: CPT

## 2019-07-29 PROCEDURE — 99283 EMERGENCY DEPT VISIT LOW MDM: CPT

## 2019-07-29 RX ORDER — ONDANSETRON 4 MG/1
4 TABLET, ORALLY DISINTEGRATING ORAL
Qty: 10 TAB | Refills: 0 | Status: SHIPPED | OUTPATIENT
Start: 2019-07-29

## 2019-07-29 NOTE — ED PROVIDER NOTES
No  was used. Addiction problem   There areno confusion, no somnolence, no loss of consciousness, no seizures, no weakness, no agitation, no delusions, no hallucinations, no self-injury, no violence and no intoxication present at this time. This is a chronic problem. The current episode started more than 1 week ago. The problem has not changed since onset. Suspected agents include heroin. Pertinent negatives include no fever, no injury, no nausea, no vomiting, no bladder incontinence and no bowel incontinence. Associated medical issues include addiction treatment. Associated medical issues do not include withdrawal syndrome, chronic illness, mental illness, psychiatric history, recent illness, recent infection, suicidal ideas, homicidal ideas, mental status change and depression.         Past Medical History:   Diagnosis Date    Other ill-defined conditions(241.59)     STD's       Past Surgical History:   Procedure Laterality Date    HX GYN          HX GYN      tubal ligation         Family History:   Problem Relation Age of Onset    No Known Problems Mother     Cancer Father         lung       Social History     Socioeconomic History    Marital status: SINGLE     Spouse name: Not on file    Number of children: Not on file    Years of education: Not on file    Highest education level: Not on file   Occupational History    Not on file   Social Needs    Financial resource strain: Not on file    Food insecurity:     Worry: Not on file     Inability: Not on file    Transportation needs:     Medical: Not on file     Non-medical: Not on file   Tobacco Use    Smoking status: Current Every Day Smoker     Packs/day: 0.50    Smokeless tobacco: Never Used   Substance and Sexual Activity    Alcohol use: No    Drug use: Yes     Types: Heroin, Marijuana     Comment: rarely    Sexual activity: Yes     Partners: Male     Birth control/protection: None, Surgical   Lifestyle    Physical activity:     Days per week: Not on file     Minutes per session: Not on file    Stress: Not on file   Relationships    Social connections:     Talks on phone: Not on file     Gets together: Not on file     Attends Hoahaoism service: Not on file     Active member of club or organization: Not on file     Attends meetings of clubs or organizations: Not on file     Relationship status: Not on file    Intimate partner violence:     Fear of current or ex partner: Not on file     Emotionally abused: Not on file     Physically abused: Not on file     Forced sexual activity: Not on file   Other Topics Concern    Not on file   Social History Narrative    ** Merged History Encounter **              ALLERGIES: Ibuprofen and Motrin [ibuprofen]    Review of Systems   Constitutional: Negative for activity change, chills and fever. HENT: Negative for nosebleeds, sore throat, trouble swallowing and voice change. Eyes: Negative for visual disturbance. Respiratory: Negative for shortness of breath. Cardiovascular: Negative for chest pain and palpitations. Gastrointestinal: Negative for abdominal pain, bowel incontinence, constipation, diarrhea, nausea and vomiting. Genitourinary: Negative for bladder incontinence, difficulty urinating, dysuria, hematuria and urgency. Musculoskeletal: Negative for back pain, neck pain and neck stiffness. Skin: Negative for color change. Allergic/Immunologic: Negative for immunocompromised state. Neurological: Negative for dizziness, seizures, loss of consciousness, syncope, weakness, light-headedness, numbness and headaches. Psychiatric/Behavioral: Negative for agitation, behavioral problems, confusion, depression, hallucinations, homicidal ideas, self-injury and suicidal ideas.        Vitals:    07/29/19 1603 07/29/19 1659   BP:  114/68   Pulse: (!) 104 84   Resp:  16   Temp:  98.3 °F (36.8 °C)   SpO2: 99% 98%   Weight:  72.6 kg (160 lb)   Height:  5' 2\" (1.575 m) Physical Exam   Constitutional: She appears well-developed and well-nourished. No distress. HENT:   Head: Atraumatic. Eyes: EOM are normal.   Neck: No tracheal deviation present. Cardiovascular:   Warm and well perfused   Pulmonary/Chest: Effort normal. No respiratory distress. Musculoskeletal: Normal range of motion. Neurological: She is alert. Coordination normal.   Skin: Skin is warm and dry. She is not diaphoretic. Psychiatric: She has a normal mood and affect. Her behavior is normal. Judgment and thought content normal.   Nursing note and vitals reviewed. MDM     This is a 24-year-old female with past medical history, review of systems, physical exam as above, presenting requesting detoxification services. Patient states snorting heroin on a regular basis, last used last night. She endorses previous episodes of inpatient detox, denying symptoms today. I discussed with the patient that we are unable to admit to the hospital for opiate detoxification. Will  with case management, social work, to obtain a safe plan for the patient.     Procedures

## 2019-07-29 NOTE — DISCHARGE INSTRUCTIONS
Patient Education        Learning About Substance Use Disorder  What is substance use disorder? Substance use disorder means that a person uses substances even though it causes harm to themselves or others. It can range from mild to severe. The more signs of this disorder you have, the more severe it may be. Moderate to severe substance use disorder is sometimes called addiction. People who have it find it hard to control their use. This disorder can develop from the use of almost any type of substance. This includes alcohol, illegal drugs, prescription medicines, and over-the-counter medicines. Could you have substance use disorder? If there's a chance you may have substance use disorder, it's important to find out. Ask yourself the following questions. You may have substance use disorder if your answer is \"yes\" to two or more of them. · Do you use larger amounts of the substance than you ever meant to? Or have you been using it for a longer time than you ever meant to? · Are you not able to cut down or control your use? Or do you constantly wish you could cut down? · Do you spend a lot of time getting or using the substance or recovering from the effects? · Do you have strong cravings for the substance? · Do you find that you can no longer do your main jobs at work, at school, or at home? · Do you keep using, even though your substance use hurts your relationships? · Have you stopped doing important activities because of your substance use? · Do you use substances in situations where doing so is dangerous? · Do you keep using the substance even though you know it's causing health problems? · Do you need more and more of the substance to get the same effect, or do you get less effect from the same amount over time? This is called tolerance. · Do you have uncomfortable symptoms (withdrawal) when you stop using the substance or use less? Substance use disorder can range from mild to severe.  The more signs of this disorder you have, the more severe it may be. Do you think you might have substance use disorder? If you do, then you've just taken an important first step. Many people have overcome this problem. And most of them started by reaching out to others, like caring friends or family, their doctor, or a support group. How is substance use disorder treated? If you think you may have substance use disorder, talk to your doctor. You and your doctor can decide whether you have this disorder and what type of treatment might help you. If you are physically dependent on the substance, you may need to stay in a hospital at first. There you can be treated for withdrawal symptoms. One of the goals of treatment for substance use disorder is to help you get used to life without the substance. Counseling can help you prepare for people or situations that might tempt you to start using again. You can practice these skills through one-on-one counseling, family therapy, or group therapy. Therapy may be part of inpatient treatment, where you stay in a treatment center. Or it may be part of outpatient treatment, where you can fit your therapy around your job or other responsibilities. Another goal of treatment is to help you find ongoing support for your sober life. Many people find support by going to meetings like Alcoholics Anonymous, Narcotics Anonymous, or [facility]. This type of support can help you feel less alone and more motivated to stay sober. You might talk to your doctor or do an online search for local treatment programs. Or you might tell a friend or loved one that you need help. Follow-up care is a key part of your treatment and safety. Be sure to make and go to all appointments, and call your doctor if you are having problems. It's also a good idea to know your test results and keep a list of the medicines you take. Where can you learn more?   Go to http://myla.info/. Enter [phone] in the search box to learn more about \"Learning About Substance Use Disorder. \"  Current as of: February 5, 2019  Content Version: 12.1  © 6535-3350 [employer]. Care instructions adapted under license by [employer] (which disclaims liability or warranty for this information). If you have questions about a medical condition or this instruction, always ask your healthcare professional. [place] [address] any warranty or liability for your use of this information.

## 2019-07-29 NOTE — BSMART NOTE
Patient presents to ER reporting she was at [facility] [place] yesterday but they did not have a detox bed. She then went to [facility] who referred her to [facility]. Explained that [facility] does not admit for the primary reason of heroin detox. Discussed treatment options including MAT and counseling. Patient denies suicidal and homicidal ideation. She denies hallucinations. After discussion patient is considering [facility], Methadone clinics in the [facility], or [facility]. She asked for something to help with nausea until she can get into a facility. Patient to be discharged and will call facilities tomorrow morning or present for walk in hours at  [facility] or [facility]. Patient's friend is on her way to pick her up and take her home. Patient discharged and will wait for her ride to come get her.     Shirley Yung [facility]

## 2019-07-29 NOTE — ED NOTES
Discharge instructions given to patient by [facility]. Pt has been given counseling on medication use and verbalizes understanding. Pt ambulated off of unit in no signs of distress.

## 2025-03-19 ENCOUNTER — HOSPITAL ENCOUNTER (EMERGENCY)
Facility: HOSPITAL | Age: 40
Discharge: HOME | End: 2025-03-19
Attending: STUDENT IN AN ORGANIZED HEALTH CARE EDUCATION/TRAINING PROGRAM

## 2025-03-19 VITALS
RESPIRATION RATE: 14 BRPM | OXYGEN SATURATION: 99 % | DIASTOLIC BLOOD PRESSURE: 70 MMHG | SYSTOLIC BLOOD PRESSURE: 133 MMHG | WEIGHT: 167.99 LBS | HEART RATE: 77 BPM | TEMPERATURE: 98.4 F

## 2025-03-19 DIAGNOSIS — A59.9 TRICHOMONIASIS: ICD-10-CM

## 2025-03-19 DIAGNOSIS — L30.9 DERMATITIS: Primary | ICD-10-CM

## 2025-03-19 DIAGNOSIS — B35.1 ONYCHOMYCOSIS: ICD-10-CM

## 2025-03-19 DIAGNOSIS — R21 RASH AND OTHER NONSPECIFIC SKIN ERUPTION: ICD-10-CM

## 2025-03-19 DIAGNOSIS — Z20.2 POSSIBLE EXPOSURE TO STI: ICD-10-CM

## 2025-03-19 LAB
CLUE CELLS VAG QL WET PREP: ABNORMAL
HCG UR QL: NEGATIVE
T VAGINALIS VAG QL WET PREP: ABNORMAL
YEAST WET PREP: ABNORMAL

## 2025-03-19 PROCEDURE — 6370000000 HC RX 637 (ALT 250 FOR IP): Performed by: STUDENT IN AN ORGANIZED HEALTH CARE EDUCATION/TRAINING PROGRAM

## 2025-03-19 PROCEDURE — 99284 EMERGENCY DEPT VISIT MOD MDM: CPT

## 2025-03-19 PROCEDURE — 87210 SMEAR WET MOUNT SALINE/INK: CPT

## 2025-03-19 PROCEDURE — 2500000003 HC RX 250 WO HCPCS: Performed by: STUDENT IN AN ORGANIZED HEALTH CARE EDUCATION/TRAINING PROGRAM

## 2025-03-19 PROCEDURE — 6360000002 HC RX W HCPCS: Performed by: STUDENT IN AN ORGANIZED HEALTH CARE EDUCATION/TRAINING PROGRAM

## 2025-03-19 PROCEDURE — 96372 THER/PROPH/DIAG INJ SC/IM: CPT

## 2025-03-19 PROCEDURE — 81025 URINE PREGNANCY TEST: CPT

## 2025-03-19 RX ORDER — METRONIDAZOLE 500 MG/1
500 TABLET ORAL 2 TIMES DAILY
Qty: 14 TABLET | Refills: 0 | Status: SHIPPED | OUTPATIENT
Start: 2025-03-19 | End: 2025-03-26

## 2025-03-19 RX ORDER — BACITRACIN ZINC AND POLYMYXIN B SULFATE 500; 1000 [USP'U]/G; [USP'U]/G
OINTMENT TOPICAL
Qty: 28.4 G | Refills: 1 | Status: SHIPPED | OUTPATIENT
Start: 2025-03-19 | End: 2025-03-26

## 2025-03-19 RX ORDER — AZITHROMYCIN 250 MG/1
500 TABLET, FILM COATED ORAL
Status: COMPLETED | OUTPATIENT
Start: 2025-03-19 | End: 2025-03-19

## 2025-03-19 RX ORDER — BETAMETHASONE DIPROPIONATE 0.05 %
OINTMENT (GRAM) TOPICAL
Qty: 45 G | Refills: 0 | Status: SHIPPED | OUTPATIENT
Start: 2025-03-19

## 2025-03-19 RX ADMIN — AZITHROMYCIN DIHYDRATE 500 MG: 250 TABLET ORAL at 20:42

## 2025-03-19 RX ADMIN — WATER 500 MG: 1 INJECTION INTRAMUSCULAR; INTRAVENOUS; SUBCUTANEOUS at 20:42

## 2025-03-19 ASSESSMENT — PAIN DESCRIPTION - ORIENTATION: ORIENTATION: RIGHT;LEFT

## 2025-03-19 ASSESSMENT — PAIN SCALES - GENERAL: PAINLEVEL_OUTOF10: 7

## 2025-03-19 ASSESSMENT — PAIN DESCRIPTION - LOCATION: LOCATION: TOE (COMMENT WHICH ONE)

## 2025-03-19 ASSESSMENT — PAIN - FUNCTIONAL ASSESSMENT: PAIN_FUNCTIONAL_ASSESSMENT: 0-10

## 2025-03-20 NOTE — ED TRIAGE NOTES
CC rash between buttcheeks   Reports vaginal discharge and itching on rash for about a month. Patient also reports pain on the inside of both big toes

## 2025-03-20 NOTE — ED PROVIDER NOTES
[facility] EMERGENCY DEPARTMENT  EMERGENCY DEPARTMENT ENCOUNTER      Pt Name: Tarsha Ribeiro  MRN: [id number]  Birthdate 1985  Date of evaluation: 3/19/2025  Provider: MISSY Basurto    CHIEF COMPLAINT       Chief Complaint   Patient presents with    Rash    Toe Pain     Right and left          HISTORY OF PRESENT ILLNESS    Patient is a 40 yo female with history of substance abuse, cocaine abuse, STIs who presents to ED due to multiple complaints. Reports a rash to her buttocks x1 month that is pruritic. Reports she noticed the area was hyperpigmented so she has been applying bleach cream which is worsening her symptoms. Also c/o vaginal discharge and concern for STI. Reports multiple sexual partners and would like to be treated. She also c/o pain to bilateral 1st toe nails. Reports her toe nails appear \"thick\" and are digging into the side of the nail bed. Denies any fever, chills, abdominal pain, N/V/D, flank pain, dysuria, urinary frequency, urgency, hematuria. Reports LMP 25            Review of External Medical Records:     Nursing Notes were reviewed.    REVIEW OF SYSTEMS       Review of Systems   All other systems reviewed and are negative.      Except as noted above the remainder of the review of systems was reviewed and negative.       PAST MEDICAL HISTORY     Past Medical History:   Diagnosis Date    Other ill-defined conditions(799.89)     STD's         SURGICAL HISTORY       Past Surgical History:   Procedure Laterality Date    GYN      tubal ligation    GYN               CURRENT MEDICATIONS       Discharge Medication List as of 3/19/2025  9:00 PM          ALLERGIES     Ibuprofen    FAMILY HISTORY       Family History   Problem Relation Age of Onset    Cancer Father         lung    No Known Problems Mother           SOCIAL HISTORY       Social History     Socioeconomic History    Marital status: Single   Tobacco Use    Smoking status: Every Day     Current

## 2025-03-20 NOTE — DISCHARGE INSTRUCTIONS
Recommend using steroid ointment as well as antibiotic ointment as prescribed.  Stop using bleaching solution.  On follow-up with primary care physician as well as podiatry.

## 2025-07-28 ENCOUNTER — HOSPITAL ENCOUNTER (EMERGENCY)
Facility: HOSPITAL | Age: 40
Discharge: HOME | End: 2025-07-28
Attending: STUDENT IN AN ORGANIZED HEALTH CARE EDUCATION/TRAINING PROGRAM
Payer: MEDICAID

## 2025-07-28 VITALS
HEART RATE: 68 BPM | TEMPERATURE: 97.9 F | DIASTOLIC BLOOD PRESSURE: 70 MMHG | WEIGHT: 157.41 LBS | SYSTOLIC BLOOD PRESSURE: 125 MMHG | BODY MASS INDEX: 29.72 KG/M2 | HEIGHT: 61 IN | OXYGEN SATURATION: 99 % | RESPIRATION RATE: 18 BRPM

## 2025-07-28 DIAGNOSIS — K64.4 EXTERNAL HEMORRHOIDS: ICD-10-CM

## 2025-07-28 DIAGNOSIS — L29.0 ANAL PRURITUS: Primary | ICD-10-CM

## 2025-07-28 PROCEDURE — 99283 EMERGENCY DEPT VISIT LOW MDM: CPT

## 2025-07-28 RX ORDER — HYDROCORTISONE 25 MG/G
CREAM TOPICAL
Qty: 28 G | Refills: 0 | Status: SHIPPED | OUTPATIENT
Start: 2025-07-28

## 2025-07-28 ASSESSMENT — LIFESTYLE VARIABLES
HOW MANY STANDARD DRINKS CONTAINING ALCOHOL DO YOU HAVE ON A TYPICAL DAY: PATIENT DOES NOT DRINK
HOW OFTEN DO YOU HAVE A DRINK CONTAINING ALCOHOL: NEVER

## 2025-07-28 ASSESSMENT — PAIN - FUNCTIONAL ASSESSMENT: PAIN_FUNCTIONAL_ASSESSMENT: NONE - DENIES PAIN

## 2025-07-29 NOTE — ED PROVIDER NOTES
[facility] EMERGENCY DEPARTMENT  EMERGENCY DEPARTMENT ENCOUNTER      Pt Name: Tarsha Ribeiro  MRN: [id number]  Birthdate 1985  Date of evaluation: 2025  Provider: MISSY Basurto    CHIEF COMPLAINT       Chief Complaint   Patient presents with    Anal Itching         HISTORY OF PRESENT ILLNESS    Patient is a 41 yo female who presents to ED due to anal pruritus. Reports concern for tape worms. States her anus appears swollen and unsure if she has worms or hemorrhoids. Denies any h/o worms. Denies any fever, chills, N/V/D, constipation, rectal bleeding. No meds for symptom relief.             Review of External Medical Records:     Nursing Notes were reviewed.    REVIEW OF SYSTEMS       Review of Systems   All other systems reviewed and are negative.      Except as noted above the remainder of the review of systems was reviewed and negative.       PAST MEDICAL HISTORY     Past Medical History:   Diagnosis Date    Other ill-defined conditions(269.89)     STD's         SURGICAL HISTORY       Past Surgical History:   Procedure Laterality Date    GYN      tubal ligation    GYN               CURRENT MEDICATIONS       Previous Medications    BETAMETHASONE DIPROPIONATE 0.05 % OINTMENT    Apply topically 2 times daily for 1 week.       ALLERGIES     Ibuprofen    FAMILY HISTORY       Family History   Problem Relation Age of Onset    Cancer Father         lung    No Known Problems Mother           SOCIAL HISTORY       Social History     Socioeconomic History    Marital status: Single   Tobacco Use    Smoking status: Every Day     Current packs/day: 0.50     Types: Cigarettes    Smokeless tobacco: Never   Substance and Sexual Activity    Alcohol use: No    Drug use: Yes     Types: Heroin, Marijuana (Weed)   Social History Narrative         ** Merged History Encounter **           PHYSICAL EXAM         ED TRIAGE VITALS  BP: 125/70, Temp: 97.9 °F (36.6 °C), Pulse: 68, Respirations: 18,

## 2025-07-29 NOTE — ED TRIAGE NOTES
Pt arrives to the ED for c/o rectum itching for the past month. States she saw some small white worms from her rectum.